# Patient Record
Sex: FEMALE | Race: WHITE | NOT HISPANIC OR LATINO | Employment: UNEMPLOYED | URBAN - METROPOLITAN AREA
[De-identification: names, ages, dates, MRNs, and addresses within clinical notes are randomized per-mention and may not be internally consistent; named-entity substitution may affect disease eponyms.]

---

## 2021-08-25 ENCOUNTER — HOSPITAL ENCOUNTER (EMERGENCY)
Facility: MEDICAL CENTER | Age: 21
End: 2021-08-25

## 2021-08-25 VITALS
BODY MASS INDEX: 25.46 KG/M2 | HEIGHT: 71 IN | SYSTOLIC BLOOD PRESSURE: 110 MMHG | WEIGHT: 181.88 LBS | TEMPERATURE: 97.9 F | DIASTOLIC BLOOD PRESSURE: 86 MMHG | HEART RATE: 95 BPM | RESPIRATION RATE: 17 BRPM | OXYGEN SATURATION: 95 %

## 2021-08-25 PROCEDURE — 302449 STATCHG TRIAGE ONLY (STATISTIC)

## 2021-08-25 NOTE — ED NOTES
"Pt states she was unprepared for wait time. Pt stating \"im going to find somewhere else to go\". Pt aware of ama instructions. Pt signed AMA.   "

## 2021-08-25 NOTE — ED TRIAGE NOTES
"Chief Complaint   Patient presents with   • Vaginal Pain     \"throbbing\" pressure and \"stabbing razors throughout my canal.\"   • Nephrolithiasis     \"the size of a walnut and it was black\"     /86   Pulse 95   Temp 36.6 °C (97.9 °F) (Temporal)   Resp 17   Ht 1.803 m (5' 11\")   Wt 82.5 kg (181 lb 14.1 oz)   SpO2 95%   Pt informed of wait times. Educated on triage process.  Asked to return to triage RN for any new or worsening of symptoms. Thanked for patience.        "

## 2023-02-10 ENCOUNTER — OFFICE VISIT (OUTPATIENT)
Dept: MEDICAL GROUP | Facility: MEDICAL CENTER | Age: 23
End: 2023-02-10
Attending: FAMILY MEDICINE
Payer: MEDICAID

## 2023-02-10 ENCOUNTER — PHARMACY VISIT (OUTPATIENT)
Dept: PHARMACY | Facility: MEDICAL CENTER | Age: 23
End: 2023-02-10
Payer: COMMERCIAL

## 2023-02-10 VITALS
HEART RATE: 69 BPM | BODY MASS INDEX: 24.16 KG/M2 | TEMPERATURE: 98.1 F | SYSTOLIC BLOOD PRESSURE: 100 MMHG | OXYGEN SATURATION: 99 % | DIASTOLIC BLOOD PRESSURE: 62 MMHG | WEIGHT: 163.1 LBS | HEIGHT: 69 IN | RESPIRATION RATE: 16 BRPM

## 2023-02-10 DIAGNOSIS — Z30.8 ENCOUNTER FOR OTHER CONTRACEPTIVE MANAGEMENT: ICD-10-CM

## 2023-02-10 DIAGNOSIS — F33.2 SEVERE EPISODE OF RECURRENT MAJOR DEPRESSIVE DISORDER, WITHOUT PSYCHOTIC FEATURES (HCC): ICD-10-CM

## 2023-02-10 DIAGNOSIS — R06.02 SOB (SHORTNESS OF BREATH): ICD-10-CM

## 2023-02-10 DIAGNOSIS — G47.09 OTHER INSOMNIA: ICD-10-CM

## 2023-02-10 DIAGNOSIS — F43.10 PTSD (POST-TRAUMATIC STRESS DISORDER): ICD-10-CM

## 2023-02-10 DIAGNOSIS — Z23 NEED FOR VACCINATION: ICD-10-CM

## 2023-02-10 DIAGNOSIS — F48.9 MENTAL HEALTH PROBLEM: ICD-10-CM

## 2023-02-10 PROCEDURE — 90471 IMMUNIZATION ADMIN: CPT | Performed by: FAMILY MEDICINE

## 2023-02-10 PROCEDURE — 99213 OFFICE O/P EST LOW 20 MIN: CPT | Performed by: FAMILY MEDICINE

## 2023-02-10 PROCEDURE — RXMED WILLOW AMBULATORY MEDICATION CHARGE: Performed by: FAMILY MEDICINE

## 2023-02-10 PROCEDURE — 90677 PCV20 VACCINE IM: CPT | Performed by: FAMILY MEDICINE

## 2023-02-10 PROCEDURE — 99214 OFFICE O/P EST MOD 30 MIN: CPT | Mod: 25 | Performed by: FAMILY MEDICINE

## 2023-02-10 PROCEDURE — 90686 IIV4 VACC NO PRSV 0.5 ML IM: CPT

## 2023-02-10 RX ORDER — ESCITALOPRAM OXALATE 10 MG/1
10 TABLET ORAL DAILY
Qty: 30 TABLET | Refills: 5 | Status: SHIPPED | OUTPATIENT
Start: 2023-02-10

## 2023-02-10 RX ORDER — QUETIAPINE FUMARATE 25 MG/1
25 TABLET, FILM COATED ORAL NIGHTLY
Qty: 30 TABLET | Refills: 5 | Status: SHIPPED | OUTPATIENT
Start: 2023-02-10 | End: 2023-02-15 | Stop reason: SDUPTHER

## 2023-02-10 ASSESSMENT — PATIENT HEALTH QUESTIONNAIRE - PHQ9
SUM OF ALL RESPONSES TO PHQ QUESTIONS 1-9: 23
5. POOR APPETITE OR OVEREATING: 2 - MORE THAN HALF THE DAYS
CLINICAL INTERPRETATION OF PHQ2 SCORE: 6

## 2023-02-10 NOTE — ASSESSMENT & PLAN NOTE
Was previously given trazodone 150mg. She reports that it causes nightmares related to PTSD so she tries not to use it.

## 2023-02-10 NOTE — ASSESSMENT & PLAN NOTE
Pt had nexplanon placed, she thinks 08/2017 or 2018. Was supposed to be taken out 2021.   She is interested in nonhormonal IUD
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
## 2023-02-10 NOTE — ASSESSMENT & PLAN NOTE
Pt reports 1 day of thoughts of hurting herself but no plan   Ongoing depression since being a child  Right now, no si/hi. Used to have visual hallucinations but no longer. She does hear voices. Never command, just conversational  Denies history of manic episode   She has tried antidepressants before - prozac, zoloft, celexa, hydroxyzine, effexor (bad side effects), cymbalta (didn't help), abilify (made her lactate and weight gain)    Depression Screening    Little interest or pleasure in doing things?  3 - nearly every day   Feeling down, depressed , or hopeless? 3 - nearly every day   Trouble falling or staying asleep, or sleeping too much?  3 - nearly every day   Feeling tired or having little energy?  3 - nearly every day   Poor appetite or overeating?  2 - more than half the days   Feeling bad about yourself - or that you are a failure or have let yourself or your family down? 3 - nearly every day   Trouble concentrating on things, such as reading the newspaper or watching television? 3 - nearly every day   Moving or speaking so slowly that other people could have noticed.  Or the opposite - being so fidgety or restless that you have been moving around a lot more than usual?  2 - more than half the days   Thoughts that you would be better off dead, or of hurting yourself?  1 - several days   Patient Health Questionnaire Score: 23       If depressive symptoms identified deferred to follow up visit unless specifically addressed in assesment and plan.    Interpretation of PHQ-9 Total Score   Score Severity   1-4 No Depression   5-9 Mild Depression   10-14 Moderate Depression   15-19 Moderately Severe Depression   20-27 Severe Depression
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
## 2023-02-15 ENCOUNTER — OFFICE VISIT (OUTPATIENT)
Dept: MEDICAL GROUP | Facility: MEDICAL CENTER | Age: 23
End: 2023-02-15
Attending: FAMILY MEDICINE
Payer: MEDICAID

## 2023-02-15 VITALS
TEMPERATURE: 97.8 F | BODY MASS INDEX: 23.19 KG/M2 | RESPIRATION RATE: 14 BRPM | OXYGEN SATURATION: 98 % | DIASTOLIC BLOOD PRESSURE: 64 MMHG | SYSTOLIC BLOOD PRESSURE: 110 MMHG | WEIGHT: 153 LBS | HEIGHT: 68 IN | HEART RATE: 67 BPM

## 2023-02-15 DIAGNOSIS — F33.2 SEVERE EPISODE OF RECURRENT MAJOR DEPRESSIVE DISORDER, WITHOUT PSYCHOTIC FEATURES (HCC): ICD-10-CM

## 2023-02-15 DIAGNOSIS — R06.02 SOB (SHORTNESS OF BREATH): ICD-10-CM

## 2023-02-15 DIAGNOSIS — G47.09 OTHER INSOMNIA: ICD-10-CM

## 2023-02-15 PROCEDURE — 99213 OFFICE O/P EST LOW 20 MIN: CPT | Performed by: FAMILY MEDICINE

## 2023-02-15 PROCEDURE — 99214 OFFICE O/P EST MOD 30 MIN: CPT | Performed by: FAMILY MEDICINE

## 2023-02-15 RX ORDER — ALBUTEROL SULFATE 90 UG/1
2 AEROSOL, METERED RESPIRATORY (INHALATION) EVERY 6 HOURS PRN
Qty: 18 G | Refills: 5 | Status: SHIPPED | OUTPATIENT
Start: 2023-02-15

## 2023-02-15 RX ORDER — QUETIAPINE FUMARATE 50 MG/1
50 TABLET, FILM COATED ORAL NIGHTLY
Qty: 30 TABLET | Refills: 3 | Status: SHIPPED | OUTPATIENT
Start: 2023-02-15

## 2023-02-16 PROCEDURE — RXMED WILLOW AMBULATORY MEDICATION CHARGE: Performed by: FAMILY MEDICINE

## 2023-02-16 NOTE — ASSESSMENT & PLAN NOTE
Pt was started on seroquel 25mg once a day for sleep  She reports it has been helping her stay asleep but not helping her fall asleep.

## 2023-02-16 NOTE — ASSESSMENT & PLAN NOTE
Pt reports she started on lexpapro, and she reports that it has been very helpful   She states that she is not having any side effects with it.

## 2023-02-16 NOTE — ASSESSMENT & PLAN NOTE
Has had it since she was 16 years old.   Uses her boyfriend's mother's inhaler, and she thinks it is albuterol inhaler. It helps significantly.   Triggers include: sometimes activity induced, sometimes with chest pressure, heat/steam  She does get wheezing as well   Significantly improved after she stopped smoking

## 2023-02-16 NOTE — PROGRESS NOTES
Subjective:     CC:   Chief Complaint   Patient presents with    Sleep Problem     Would like to discuss increasing Seroquel          HPI:     Other insomnia  Pt was started on seroquel 25mg once a day for sleep  She reports it has been helping her stay asleep but not helping her fall asleep.       Severe episode of recurrent major depressive disorder, without psychotic features (HCC)  Pt reports she started on lexpapro, and she reports that it has been very helpful   She states that she is not having any side effects with it.     SOB (shortness of breath)  Has had it since she was 16 years old.   Uses her boyfriend's mother's inhaler, and she thinks it is albuterol inhaler. It helps significantly.   Triggers include: sometimes activity induced, sometimes with chest pressure, heat/steam  She does get wheezing as well   Significantly improved after she stopped smoking     No past medical history on file.    Social History     Tobacco Use    Smoking status: Former     Packs/day: 2.00     Years: 9.00     Pack years: 18.00     Types: Cigarettes     Quit date: 2022     Years since quittin.2    Smokeless tobacco: Never   Vaping Use    Vaping Use: Every day    Substances: Nicotine    Devices: Disposable   Substance Use Topics    Alcohol use: Not Currently    Drug use: Yes     Types: Marijuana       Current Outpatient Medications Ordered in Epic   Medication Sig Dispense Refill    QUEtiapine (SEROQUEL) 50 MG tablet Take 1 Tablet by mouth every evening. 30 Tablet 3    albuterol 108 (90 Base) MCG/ACT Aero Soln inhalation aerosol Inhale 2 Puffs every 6 hours as needed for Shortness of Breath. 18 g 5    escitalopram (LEXAPRO) 10 MG Tab Take 1 Tablet by mouth every day. 30 Tablet 5     No current Epic-ordered facility-administered medications on file.       Allergies:  Latex and Tomato        Objective:       Exam:  /64 (BP Location: Left arm, Patient Position: Sitting, BP Cuff Size: Adult)   Pulse 67   Temp  "36.6 °C (97.8 °F) (Temporal)   Resp 14   Ht 1.715 m (5' 7.5\")   Wt 69.4 kg (153 lb)   SpO2 98%   BMI 23.61 kg/m²  Body mass index is 23.61 kg/m².    General: Normal appearing. No distress.  Pulmonary: Clear to ausculation.  Normal effort. No rales, ronchi, or wheezing.  Cardiovascular: Regular rate and rhythm without murmur.   Psych: Normal mood and affect. Alert and oriented x3. Judgment and insight is normal.      Labs:   none    Assessment & Plan:     22 y.o. female with the following -     1. Other insomnia  - QUEtiapine (SEROQUEL) 50 MG tablet; Take 1 Tablet by mouth every evening.  Dispense: 30 Tablet; Refill: 3  Increase dose of seroquel to 50mg nightly for insomnia. She appreciates the benefit so far.     2. Severe episode of recurrent major depressive disorder, without psychotic features (HCC)  - QUEtiapine (SEROQUEL) 50 MG tablet; Take 1 Tablet by mouth every evening.  Dispense: 30 Tablet; Refill: 3  Has been doing very well with lexapro 10mg, even after 5 days. Continue current dose, reeval at next visit     3. SOB (shortness of breath)  - albuterol 108 (90 Base) MCG/ACT Aero Soln inhalation aerosol; Inhale 2 Puffs every 6 hours as needed for Shortness of Breath.  Dispense: 18 g; Refill: 5  Sounds somewhat like reactive airway, especially with significant benefit from albuterol inhaler. She is prescribed one today, advised to keep track of how often she needs it.    Return in about 3 weeks (around 3/8/2023) for as bao.    Please note that this dictation was created using voice recognition software. I have made every reasonable attempt to correct obvious errors, but I expect that there are errors of grammar and possibly content that I did not discover before finalizing the note.        "

## 2023-02-25 ENCOUNTER — APPOINTMENT (OUTPATIENT)
Dept: RADIOLOGY | Facility: MEDICAL CENTER | Age: 23
End: 2023-02-25
Attending: FAMILY MEDICINE
Payer: MEDICAID

## 2023-03-01 ENCOUNTER — PHARMACY VISIT (OUTPATIENT)
Dept: PHARMACY | Facility: MEDICAL CENTER | Age: 23
End: 2023-03-01
Payer: COMMERCIAL

## 2023-03-16 ENCOUNTER — PHARMACY VISIT (OUTPATIENT)
Dept: PHARMACY | Facility: MEDICAL CENTER | Age: 23
End: 2023-03-16
Payer: COMMERCIAL

## 2023-03-16 PROCEDURE — RXMED WILLOW AMBULATORY MEDICATION CHARGE: Performed by: FAMILY MEDICINE

## 2024-04-18 ENCOUNTER — GYNECOLOGY VISIT (OUTPATIENT)
Dept: OBGYN | Facility: CLINIC | Age: 24
End: 2024-04-18
Payer: MEDICAID

## 2024-04-18 ENCOUNTER — HOSPITAL ENCOUNTER (OUTPATIENT)
Facility: MEDICAL CENTER | Age: 24
End: 2024-04-18
Attending: NURSE PRACTITIONER
Payer: MEDICAID

## 2024-04-18 VITALS — WEIGHT: 163 LBS | SYSTOLIC BLOOD PRESSURE: 100 MMHG | DIASTOLIC BLOOD PRESSURE: 60 MMHG | BODY MASS INDEX: 25.15 KG/M2

## 2024-04-18 DIAGNOSIS — Z30.46 NEXPLANON REMOVAL: ICD-10-CM

## 2024-04-18 DIAGNOSIS — J45.20 MILD INTERMITTENT ASTHMA WITHOUT COMPLICATION: ICD-10-CM

## 2024-04-18 DIAGNOSIS — Z01.419 WELL WOMAN EXAM WITH ROUTINE GYNECOLOGICAL EXAM: Primary | ICD-10-CM

## 2024-04-18 DIAGNOSIS — F90.1 ATTENTION DEFICIT HYPERACTIVITY DISORDER (ADHD), PREDOMINANTLY HYPERACTIVE TYPE: ICD-10-CM

## 2024-04-18 DIAGNOSIS — G43.109 MIGRAINE WITH AURA AND WITHOUT STATUS MIGRAINOSUS, NOT INTRACTABLE: ICD-10-CM

## 2024-04-18 DIAGNOSIS — Z01.419 WELL WOMAN EXAM WITH ROUTINE GYNECOLOGICAL EXAM: ICD-10-CM

## 2024-04-18 DIAGNOSIS — F19.91 HISTORY OF DRUG USE: ICD-10-CM

## 2024-04-18 DIAGNOSIS — Z31.69 PRE-CONCEPTION COUNSELING: ICD-10-CM

## 2024-04-18 PROBLEM — Z30.8 ENCOUNTER FOR OTHER CONTRACEPTIVE MANAGEMENT: Status: RESOLVED | Noted: 2023-02-10 | Resolved: 2024-04-18

## 2024-04-18 PROCEDURE — 11982 REMOVE DRUG IMPLANT DEVICE: CPT | Performed by: NURSE PRACTITIONER

## 2024-04-18 PROCEDURE — 3078F DIAST BP <80 MM HG: CPT | Performed by: NURSE PRACTITIONER

## 2024-04-18 PROCEDURE — 88175 CYTOPATH C/V AUTO FLUID REDO: CPT

## 2024-04-18 PROCEDURE — 87491 CHLMYD TRACH DNA AMP PROBE: CPT

## 2024-04-18 PROCEDURE — 3074F SYST BP LT 130 MM HG: CPT | Performed by: NURSE PRACTITIONER

## 2024-04-18 PROCEDURE — 87591 N.GONORRHOEAE DNA AMP PROB: CPT

## 2024-04-18 PROCEDURE — G0101 CA SCREEN;PELVIC/BREAST EXAM: HCPCS | Performed by: NURSE PRACTITIONER

## 2024-04-18 NOTE — PROCEDURES
General Procedure    Date/Time: 4/18/2024 8:54 AM    Performed by: Bruna Whitman C.N.M.  Authorized by: Bruna Whitman C.N.M.  Preparation: Patient was prepped and draped in the usual sterile fashion.  Local anesthesia used: yes  Anesthesia: local infiltration    Anesthesia:  Local anesthesia used: yes  Local Anesthetic: lidocaine 1% with epinephrine  Anesthetic total: 5 mL    Sedation:  Patient sedated: no    Patient tolerance: patient tolerated the procedure well with no immediate complications        Procedure note: Nexplanon removal;     Informed consent for removal of Nexplanon is obtained from patient, Risks of the procedure are bleeding, infection, possible difficulty with removing the implant, possible breakage of the implant, possible scarring. Patient is told of the details of the procedure and signs the consent.  The patient is positioned with her left arm in a 90 degree fashion, the implant is located and palpated.  5cc of 1% Lidocaine is injected subdermally into the area located  Betadine solution is used to cleanse the skin  After proper local anesthesia is obtained, a small incision is made with size 11 scalpel  The implant is worked out of the incision with small curved hemostat and sterile gauze  Wound is cleaned and steri-strips are placed over the incision   A pressure dressing is applied and encouraged to stay on for 24 hours   Then remove the dressing and leave the steri-strips on.  Watch for signs and symptoms of infection, redness at the site, purulent drainage.     Tolerated well    Follow up for s/x of infection or bleeding    Bruna TORRESM, APRN

## 2024-04-18 NOTE — PROGRESS NOTES
Pt here for new pt appt  Pt states she would like nexplanon removed and wants a pap  Pharmacy verified  Good #: [phone] (home)    LMP: 4/1/24  PAP: pap today  BC: nexplanon would like it removed  STD Testing:  MAMMO:
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
## 2024-04-22 LAB
C TRACH RRNA CVX QL NAA+PROBE: NEGATIVE
COMMENT NL11729A: NORMAL
CYTOLOGIST CVX/VAG CYTO: NORMAL
CYTOLOGY CVX/VAG DOC CYTO: NORMAL
CYTOLOGY CVX/VAG DOC THIN PREP: NORMAL
N GONORRHOEA RRNA CVX QL NAA+PROBE: NEGATIVE
NOTE NL11727A: NORMAL
OTHER STN SPEC: NORMAL
STAT OF ADQ CVX/VAG CYTO-IMP: NORMAL

## 2024-08-30 ENCOUNTER — ANCILLARY PROCEDURE (OUTPATIENT)
Dept: OBGYN | Facility: CLINIC | Age: 24
End: 2024-08-30
Payer: MEDICAID

## 2024-08-30 DIAGNOSIS — N91.2 AMENORRHEA: ICD-10-CM

## 2024-08-30 PROCEDURE — 76801 OB US < 14 WKS SINGLE FETUS: CPT | Performed by: OBSTETRICS & GYNECOLOGY

## 2024-09-04 ENCOUNTER — INITIAL PRENATAL (OUTPATIENT)
Dept: OBGYN | Facility: CLINIC | Age: 24
End: 2024-09-04
Payer: MEDICAID

## 2024-09-04 ENCOUNTER — APPOINTMENT (OUTPATIENT)
Dept: OBGYN | Facility: CLINIC | Age: 24
End: 2024-09-04
Payer: MEDICAID

## 2024-09-04 ENCOUNTER — HOSPITAL ENCOUNTER (OUTPATIENT)
Facility: MEDICAL CENTER | Age: 24
End: 2024-09-04
Attending: OBSTETRICS & GYNECOLOGY | Admitting: OBSTETRICS & GYNECOLOGY
Payer: MEDICAID

## 2024-09-04 ENCOUNTER — HOSPITAL ENCOUNTER (OUTPATIENT)
Facility: MEDICAL CENTER | Age: 24
End: 2024-09-04
Payer: MEDICAID

## 2024-09-04 VITALS — SYSTOLIC BLOOD PRESSURE: 98 MMHG | BODY MASS INDEX: 24.57 KG/M2 | DIASTOLIC BLOOD PRESSURE: 62 MMHG | WEIGHT: 159.2 LBS

## 2024-09-04 DIAGNOSIS — Z34.81 ENCOUNTER FOR SUPERVISION OF OTHER NORMAL PREGNANCY, FIRST TRIMESTER: ICD-10-CM

## 2024-09-04 DIAGNOSIS — Z34.01 ENCOUNTER FOR SUPERVISION OF NORMAL FIRST PREGNANCY IN FIRST TRIMESTER: ICD-10-CM

## 2024-09-04 DIAGNOSIS — F19.91 HISTORY OF DRUG USE: ICD-10-CM

## 2024-09-04 PROCEDURE — 3078F DIAST BP <80 MM HG: CPT

## 2024-09-04 PROCEDURE — 87491 CHLMYD TRACH DNA AMP PROBE: CPT

## 2024-09-04 PROCEDURE — 87591 N.GONORRHOEAE DNA AMP PROB: CPT

## 2024-09-04 PROCEDURE — 3074F SYST BP LT 130 MM HG: CPT

## 2024-09-04 PROCEDURE — 0500F INITIAL PRENATAL CARE VISIT: CPT

## 2024-09-04 RX ORDER — PNV NO.95/FERROUS FUM/FOLIC AC 28MG-0.8MG
TABLET ORAL
COMMUNITY

## 2024-09-04 RX ORDER — PNV NO.95/FERROUS FUM/FOLIC AC 28MG-0.8MG
1 TABLET ORAL DAILY
Qty: 30 TABLET | Refills: 11 | Status: SHIPPED | OUTPATIENT
Start: 2024-09-04

## 2024-09-04 ASSESSMENT — EDINBURGH POSTNATAL DEPRESSION SCALE (EPDS)
I HAVE BEEN SO UNHAPPY THAT I HAVE BEEN CRYING: NO, NEVER
I HAVE BEEN ABLE TO LAUGH AND SEE THE FUNNY SIDE OF THINGS: AS MUCH AS I ALWAYS COULD
I HAVE BLAMED MYSELF UNNECESSARILY WHEN THINGS WENT WRONG: YES, SOME OF THE TIME
THINGS HAVE BEEN GETTING ON TOP OF ME: YES, SOMETIMES I HAVEN'T BEEN COPING AS WELL AS USUAL
I HAVE FELT SCARED OR PANICKY FOR NO GOOD REASON: NO, NOT MUCH
I HAVE FELT SAD OR MISERABLE: NOT VERY OFTEN
I HAVE LOOKED FORWARD WITH ENJOYMENT TO THINGS: RATHER LESS THAN I USED TO
I HAVE BEEN SO UNHAPPY THAT I HAVE HAD DIFFICULTY SLEEPING: NOT AT ALL
TOTAL SCORE: 10
I HAVE BEEN ANXIOUS OR WORRIED FOR NO GOOD REASON: YES, VERY OFTEN
THE THOUGHT OF HARMING MYSELF HAS OCCURRED TO ME: NEVER
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
## 2024-09-04 NOTE — PROGRESS NOTES
NOB today  LMP: mid June irregular   Last pap: 4/18/24  Phone # [phone]  Pharmacy confirmed  On PNV  Genetic screening nipt testing pt doesn't want to know gender pt is doing a gender reveal.   EPDS= 10  c/o

## 2024-09-04 NOTE — LETTER
September 4, 2024            Mellisa Nunez is pregnant with an estimated delivery date of 3/24/25 at this time.        Thank you,          Anastacia Inman C.N.M.    Electronically Signed

## 2024-09-05 LAB
C TRACH DNA GENITAL QL NAA+PROBE: NEGATIVE
N GONORRHOEA DNA GENITAL QL NAA+PROBE: NEGATIVE
SPECIMEN SOURCE: NORMAL

## 2024-09-06 ENCOUNTER — HOSPITAL ENCOUNTER (OUTPATIENT)
Dept: LAB | Facility: MEDICAL CENTER | Age: 24
End: 2024-09-06
Payer: MEDICAID

## 2024-09-06 DIAGNOSIS — Z34.01 ENCOUNTER FOR SUPERVISION OF NORMAL FIRST PREGNANCY IN FIRST TRIMESTER: ICD-10-CM

## 2024-09-06 LAB
ABO GROUP BLD: NORMAL
BLD GP AB SCN SERPL QL: NORMAL
ERYTHROCYTE [DISTWIDTH] IN BLOOD BY AUTOMATED COUNT: 45.6 FL (ref 35.9–50)
HBV SURFACE AG SER QL: ABNORMAL
HCT VFR BLD AUTO: 43.1 % (ref 37–47)
HCV AB SER QL: ABNORMAL
HGB BLD-MCNC: 14.8 G/DL (ref 12–16)
HIV 1+2 AB+HIV1 P24 AG SERPL QL IA: NORMAL
MCH RBC QN AUTO: 33.3 PG (ref 27–33)
MCHC RBC AUTO-ENTMCNC: 34.3 G/DL (ref 32.2–35.5)
MCV RBC AUTO: 96.9 FL (ref 81.4–97.8)
PLATELET # BLD AUTO: 201 K/UL (ref 164–446)
PMV BLD AUTO: 11 FL (ref 9–12.9)
RBC # BLD AUTO: 4.45 M/UL (ref 4.2–5.4)
RH BLD: NORMAL
RUBV AB SER QL: 96 IU/ML
T PALLIDUM AB SER QL IA: ABNORMAL
WBC # BLD AUTO: 7.5 K/UL (ref 4.8–10.8)

## 2024-09-06 PROCEDURE — 36415 COLL VENOUS BLD VENIPUNCTURE: CPT

## 2024-09-06 PROCEDURE — 86803 HEPATITIS C AB TEST: CPT

## 2024-09-06 PROCEDURE — 86762 RUBELLA ANTIBODY: CPT

## 2024-09-06 PROCEDURE — 86900 BLOOD TYPING SEROLOGIC ABO: CPT

## 2024-09-06 PROCEDURE — 85027 COMPLETE CBC AUTOMATED: CPT

## 2024-09-06 PROCEDURE — 87086 URINE CULTURE/COLONY COUNT: CPT

## 2024-09-06 PROCEDURE — 86780 TREPONEMA PALLIDUM: CPT

## 2024-09-06 PROCEDURE — 86850 RBC ANTIBODY SCREEN: CPT

## 2024-09-06 PROCEDURE — 80307 DRUG TEST PRSMV CHEM ANLYZR: CPT

## 2024-09-06 PROCEDURE — 82105 ALPHA-FETOPROTEIN SERUM: CPT

## 2024-09-06 PROCEDURE — 87077 CULTURE AEROBIC IDENTIFY: CPT

## 2024-09-06 PROCEDURE — G0480 DRUG TEST DEF 1-7 CLASSES: HCPCS

## 2024-09-06 PROCEDURE — 87389 HIV-1 AG W/HIV-1&-2 AB AG IA: CPT

## 2024-09-06 PROCEDURE — 86901 BLOOD TYPING SEROLOGIC RH(D): CPT

## 2024-09-06 PROCEDURE — 87340 HEPATITIS B SURFACE AG IA: CPT

## 2024-09-08 LAB
AMPHET CTO UR CFM-MCNC: NEGATIVE NG/ML
BACTERIA UR CULT: ABNORMAL
BACTERIA UR CULT: ABNORMAL
BARBITURATES CTO UR CFM-MCNC: NEGATIVE NG/ML
BENZODIAZ CTO UR CFM-MCNC: NEGATIVE NG/ML
CANNABINOIDS CTO UR CFM-MCNC: NORMAL NG/ML
COCAINE CTO UR CFM-MCNC: NEGATIVE NG/ML
CREAT UR-MCNC: 161.6 MG/DL (ref 20–400)
DRUG COMMENT 753798: NORMAL
METHADONE CTO UR CFM-MCNC: NEGATIVE NG/ML
OPIATES CTO UR CFM-MCNC: NEGATIVE NG/ML
PCP CTO UR CFM-MCNC: NEGATIVE NG/ML
PROPOXYPH CTO UR CFM-MCNC: NEGATIVE NG/ML
SIGNIFICANT IND 70042: ABNORMAL
SITE SITE: ABNORMAL
SOURCE SOURCE: ABNORMAL

## 2024-09-10 ENCOUNTER — TELEPHONE (OUTPATIENT)
Dept: OBGYN | Facility: CLINIC | Age: 24
End: 2024-09-10
Payer: MEDICAID

## 2024-09-10 LAB
# FETUSES US: NORMAL
AFP MOM SERPL: NORMAL
AFP SERPL-MCNC: 7 NG/ML
AGE - REPORTED: 25 YR
CURRENT SMOKER: NO
FAMILY MEMBER DISEASES HX: NO
GA METHOD: NORMAL
GA: NORMAL WK
IDDM PATIENT QL: NO
INTEGRATED SCN PATIENT-IMP: NORMAL
SPECIMEN DRAWN SERPL: NORMAL

## 2024-09-10 RX ORDER — METRONIDAZOLE 500 MG/1
500 TABLET ORAL 2 TIMES DAILY
Qty: 14 TABLET | Refills: 0 | Status: SHIPPED | OUTPATIENT
Start: 2024-09-10 | End: 2024-09-11 | Stop reason: SDUPTHER

## 2024-09-10 NOTE — TELEPHONE ENCOUNTER
----- Message from Midwife Anastacia Inman C.N.M. sent at 9/10/2024  9:18 AM PDT -----  Please call pt to disc UTI, Rx sent to pharmacy.    9/9/2024 1004  Pt notified of UTI and needs to start on antibiotics, instructions given. Advised pt to increase water intake to minimum of 4 lt of water a day. Rx sent by provider. Pharmacy verified with pt.   Informed pt to take the med with food, but not milk and to avoid alcohol while on the med. Pt agreed and verbalized understanding.

## 2024-09-11 RX ORDER — METRONIDAZOLE 500 MG/1
500 TABLET ORAL 2 TIMES DAILY
Qty: 14 TABLET | Refills: 0 | Status: SHIPPED | OUTPATIENT
Start: 2024-09-11 | End: 2024-09-18

## 2024-09-12 ENCOUNTER — ANCILLARY PROCEDURE (OUTPATIENT)
Dept: OBGYN | Facility: CLINIC | Age: 24
End: 2024-09-12
Payer: MEDICAID

## 2024-09-12 VITALS
SYSTOLIC BLOOD PRESSURE: 100 MMHG | HEART RATE: 81 BPM | DIASTOLIC BLOOD PRESSURE: 66 MMHG | WEIGHT: 157.6 LBS | BODY MASS INDEX: 24.32 KG/M2

## 2024-09-12 DIAGNOSIS — Z34.01 ENCOUNTER FOR SUPERVISION OF NORMAL FIRST PREGNANCY IN FIRST TRIMESTER: ICD-10-CM

## 2024-09-12 LAB — THC UR CFM-MCNC: >500 NG/ML

## 2024-09-12 PROCEDURE — 99214 OFFICE O/P EST MOD 30 MIN: CPT | Performed by: OBSTETRICS & GYNECOLOGY

## 2024-09-12 PROCEDURE — 76801 OB US < 14 WKS SINGLE FETUS: CPT | Performed by: OBSTETRICS & GYNECOLOGY

## 2024-09-13 NOTE — TELEPHONE ENCOUNTER
Received request via: Patient    Was the patient seen in the last year in this department? Yes    Does the patient have an active prescription (recently filled or refills available) for medication(s) requested? Yes. Just need to update pharmacy to [facility]!    Pharmacy Name: [facility] on [address]    Does the patient have MCFP Plus and need 100-day supply? (This applies to ALL medications) Patient does not have SCP

## 2024-09-16 LAB
Lab: NORMAL
NTRA 1P36 DELETION SYNDROME POPULATION-BASED RISK TEXT: NORMAL
NTRA 1P36 DELETION SYNDROME RESULT TEXT: NORMAL
NTRA 1P36 DELETION SYNDROME RISK SCORE TEXT: NORMAL
NTRA 22Q11.2 DELETION SYNDROME POPULATION-BASED RISK TEXT: NORMAL
NTRA 22Q11.2 DELETION SYNDROME RESULT TEXT: NORMAL
NTRA 22Q11.2 DELETION SYNDROME RISK SCORE TEXT: NORMAL
NTRA ANGELMAN SYNDROME POPULATION-BASED RISK TEXT: NORMAL
NTRA ANGELMAN SYNDROME RESULT TEXT: NORMAL
NTRA ANGELMAN SYNDROME RISK SCORE TEXT: NORMAL
NTRA CRI-DU-CHAT SYNDROME POPULATION-BASED RISK TEXT: NORMAL
NTRA CRI-DU-CHAT SYNDROME RESULT TEXT: NORMAL
NTRA CRI-DU-CHAT SYNDROME RISK SCORE TEXT: NORMAL
NTRA FETAL FRACTION: NORMAL
NTRA GENDER OF FETUS: NORMAL
NTRA MONOSOMY X AGE-BASED RISK TEXT: NORMAL
NTRA MONOSOMY X RESULT TEXT: NORMAL
NTRA MONOSOMY X RISK SCORE TEXT: NORMAL
NTRA PRADER-WILLI SYNDROME POPULATION-BASED RISK TEXT: NORMAL
NTRA PRADER-WILLI SYNDROME RESULT TEXT: NORMAL
NTRA PRADER-WILLI SYNDROME RISK SCORE TEXT: NORMAL
NTRA TRIPLOIDY RESULT TEXT: NORMAL
NTRA TRISOMY 13 AGE-BASED RISK TEXT: NORMAL
NTRA TRISOMY 13 RESULT TEXT: NORMAL
NTRA TRISOMY 13 RISK SCORE TEXT: NORMAL
NTRA TRISOMY 18 AGE-BASED RISK TEXT: NORMAL
NTRA TRISOMY 18 RESULT TEXT: NORMAL
NTRA TRISOMY 18 RISK SCORE TEXT: NORMAL
NTRA TRISOMY 21 AGE-BASED RISK TEXT: NORMAL
NTRA TRISOMY 21 RESULT TEXT: NORMAL
NTRA TRISOMY 21 RISK SCORE TEXT: NORMAL

## 2024-09-17 ENCOUNTER — TELEPHONE (OUTPATIENT)
Dept: OBGYN | Facility: CLINIC | Age: 24
End: 2024-09-17
Payer: MEDICAID

## 2024-09-17 DIAGNOSIS — Z34.82 ENCOUNTER FOR SUPERVISION OF OTHER NORMAL PREGNANCY, SECOND TRIMESTER: ICD-10-CM

## 2024-09-17 NOTE — TELEPHONE ENCOUNTER
"----- Message from Midwife Anastacia Inman C.N.M. sent at 9/17/2024 10:19 AM PDT -----  Please call pt to notify her that she will need to repeat NIPT for genetic testing. Her last test resulted \"no results due to limitations of the test algorithm\" and recommended a repeat sample. Order has been placed though pt will need to come  a box.     9/17/2024 1127  Called pt and notified as above. Pt verbalized understanding. Pt would like to have it drawn again and will come  lab slip and box tomorrow. Informed pt that we will have it at the  for her to . Pt verbalized understanding.      "

## 2024-09-19 ENCOUNTER — HOSPITAL ENCOUNTER (OUTPATIENT)
Dept: LAB | Facility: MEDICAL CENTER | Age: 24
End: 2024-09-19
Payer: MEDICAID

## 2024-09-19 PROCEDURE — 36415 COLL VENOUS BLD VENIPUNCTURE: CPT

## 2024-09-19 RX ORDER — METRONIDAZOLE 500 MG/1
500 TABLET ORAL 2 TIMES DAILY
Qty: 14 TABLET | Refills: 0 | Status: SHIPPED | OUTPATIENT
Start: 2024-09-19 | End: 2024-09-26

## 2024-09-19 RX ORDER — PNV NO.95/FERROUS FUM/FOLIC AC 28MG-0.8MG
1 TABLET ORAL DAILY
Qty: 30 TABLET | Refills: 11 | OUTPATIENT
Start: 2024-09-19

## 2024-09-19 RX ORDER — ONDANSETRON 4 MG/1
4 TABLET, FILM COATED ORAL EVERY 4 HOURS PRN
Qty: 30 EACH | Refills: 0 | Status: SHIPPED | OUTPATIENT
Start: 2024-09-19

## 2024-09-19 RX ORDER — METRONIDAZOLE 500 MG/1
500 TABLET ORAL 2 TIMES DAILY
Qty: 14 TABLET | Refills: 0 | OUTPATIENT
Start: 2024-09-19 | End: 2024-09-26

## 2024-10-02 ENCOUNTER — ROUTINE PRENATAL (OUTPATIENT)
Dept: OBGYN | Facility: CLINIC | Age: 24
End: 2024-10-02
Payer: MEDICAID

## 2024-10-02 VITALS — DIASTOLIC BLOOD PRESSURE: 62 MMHG | BODY MASS INDEX: 25.18 KG/M2 | SYSTOLIC BLOOD PRESSURE: 112 MMHG | WEIGHT: 163.2 LBS

## 2024-10-02 DIAGNOSIS — Z34.01 ENCOUNTER FOR SUPERVISION OF NORMAL FIRST PREGNANCY IN FIRST TRIMESTER: ICD-10-CM

## 2024-10-02 DIAGNOSIS — Z34.02 ENCOUNTER FOR SUPERVISION OF NORMAL FIRST PREGNANCY IN SECOND TRIMESTER: ICD-10-CM

## 2024-10-02 PROCEDURE — 0502F SUBSEQUENT PRENATAL CARE: CPT

## 2024-10-02 PROCEDURE — 3078F DIAST BP <80 MM HG: CPT

## 2024-10-02 PROCEDURE — 3074F SYST BP LT 130 MM HG: CPT

## 2024-10-02 RX ORDER — PNV NO.95/FERROUS FUM/FOLIC AC 28MG-0.8MG
1 TABLET ORAL DAILY
Qty: 30 TABLET | Refills: 6 | Status: SHIPPED | OUTPATIENT
Start: 2024-10-02

## 2024-10-09 RX ORDER — PNV NO.95/FERROUS FUM/FOLIC AC 28MG-0.8MG
1 TABLET ORAL DAILY
Qty: 30 TABLET | Refills: 11 | Status: SHIPPED | OUTPATIENT
Start: 2024-10-09

## 2024-10-16 ENCOUNTER — HOSPITAL ENCOUNTER (EMERGENCY)
Facility: MEDICAL CENTER | Age: 24
End: 2024-10-16
Attending: EMERGENCY MEDICINE
Payer: MEDICAID

## 2024-10-16 VITALS
SYSTOLIC BLOOD PRESSURE: 103 MMHG | DIASTOLIC BLOOD PRESSURE: 57 MMHG | WEIGHT: 163 LBS | HEIGHT: 68 IN | HEART RATE: 63 BPM | RESPIRATION RATE: 14 BRPM | TEMPERATURE: 99 F | BODY MASS INDEX: 24.71 KG/M2 | OXYGEN SATURATION: 98 %

## 2024-10-16 DIAGNOSIS — Z3A.17 17 WEEKS GESTATION OF PREGNANCY: ICD-10-CM

## 2024-10-16 DIAGNOSIS — K52.9 GASTROENTERITIS: ICD-10-CM

## 2024-10-16 LAB
ALBUMIN SERPL BCP-MCNC: 3.8 G/DL (ref 3.2–4.9)
ALBUMIN/GLOB SERPL: 1.3 G/DL
ALP SERPL-CCNC: 60 U/L (ref 30–99)
ALT SERPL-CCNC: 12 U/L (ref 2–50)
ANION GAP SERPL CALC-SCNC: 16 MMOL/L (ref 7–16)
APPEARANCE UR: CLEAR
AST SERPL-CCNC: 18 U/L (ref 12–45)
B-HCG SERPL-ACNC: ABNORMAL MIU/ML (ref 0–5)
BASOPHILS # BLD AUTO: 0.2 % (ref 0–1.8)
BASOPHILS # BLD: 0.03 K/UL (ref 0–0.12)
BILIRUB SERPL-MCNC: 0.5 MG/DL (ref 0.1–1.5)
BILIRUB UR QL STRIP.AUTO: NEGATIVE
BUN SERPL-MCNC: 9 MG/DL (ref 8–22)
CALCIUM ALBUM COR SERPL-MCNC: 9.5 MG/DL (ref 8.5–10.5)
CALCIUM SERPL-MCNC: 9.3 MG/DL (ref 8.5–10.5)
CHLORIDE SERPL-SCNC: 102 MMOL/L (ref 96–112)
CO2 SERPL-SCNC: 17 MMOL/L (ref 20–33)
COLOR UR: YELLOW
CREAT SERPL-MCNC: 0.53 MG/DL (ref 0.5–1.4)
EKG IMPRESSION: NORMAL
EOSINOPHIL # BLD AUTO: 0.03 K/UL (ref 0–0.51)
EOSINOPHIL NFR BLD: 0.2 % (ref 0–6.9)
ERYTHROCYTE [DISTWIDTH] IN BLOOD BY AUTOMATED COUNT: 47.9 FL (ref 35.9–50)
GFR SERPLBLD CREATININE-BSD FMLA CKD-EPI: 132 ML/MIN/1.73 M 2
GLOBULIN SER CALC-MCNC: 3 G/DL (ref 1.9–3.5)
GLUCOSE SERPL-MCNC: 89 MG/DL (ref 65–99)
GLUCOSE UR STRIP.AUTO-MCNC: NEGATIVE MG/DL
HCT VFR BLD AUTO: 41.8 % (ref 37–47)
HGB BLD-MCNC: 14.8 G/DL (ref 12–16)
IMM GRANULOCYTES # BLD AUTO: 0.06 K/UL (ref 0–0.11)
IMM GRANULOCYTES NFR BLD AUTO: 0.4 % (ref 0–0.9)
KETONES UR STRIP.AUTO-MCNC: >=80 MG/DL
LEUKOCYTE ESTERASE UR QL STRIP.AUTO: NEGATIVE
LIPASE SERPL-CCNC: 23 U/L (ref 11–82)
LYMPHOCYTES # BLD AUTO: 0.59 K/UL (ref 1–4.8)
LYMPHOCYTES NFR BLD: 4.1 % (ref 22–41)
MCH RBC QN AUTO: 34.2 PG (ref 27–33)
MCHC RBC AUTO-ENTMCNC: 35.4 G/DL (ref 32.2–35.5)
MCV RBC AUTO: 96.5 FL (ref 81.4–97.8)
MICRO URNS: ABNORMAL
MONOCYTES # BLD AUTO: 0.48 K/UL (ref 0–0.85)
MONOCYTES NFR BLD AUTO: 3.3 % (ref 0–13.4)
NEUTROPHILS # BLD AUTO: 13.34 K/UL (ref 1.82–7.42)
NEUTROPHILS NFR BLD: 91.8 % (ref 44–72)
NITRITE UR QL STRIP.AUTO: NEGATIVE
NRBC # BLD AUTO: 0 K/UL
NRBC BLD-RTO: 0 /100 WBC (ref 0–0.2)
NUMBER OF RH DOSES IND 8505RD: 1
PH UR STRIP.AUTO: 6 [PH] (ref 5–8)
PLATELET # BLD AUTO: 182 K/UL (ref 164–446)
PMV BLD AUTO: 10.5 FL (ref 9–12.9)
POTASSIUM SERPL-SCNC: 4.1 MMOL/L (ref 3.6–5.5)
PROT SERPL-MCNC: 6.8 G/DL (ref 6–8.2)
PROT UR QL STRIP: NEGATIVE MG/DL
RBC # BLD AUTO: 4.33 M/UL (ref 4.2–5.4)
RBC UR QL AUTO: NEGATIVE
RH BLD: NORMAL
SODIUM SERPL-SCNC: 135 MMOL/L (ref 135–145)
SP GR UR STRIP.AUTO: >=1.03
UROBILINOGEN UR STRIP.AUTO-MCNC: 0.2 MG/DL
WBC # BLD AUTO: 14.5 K/UL (ref 4.8–10.8)

## 2024-10-16 PROCEDURE — 81003 URINALYSIS AUTO W/O SCOPE: CPT

## 2024-10-16 PROCEDURE — 86901 BLOOD TYPING SEROLOGIC RH(D): CPT

## 2024-10-16 PROCEDURE — 85025 COMPLETE CBC W/AUTO DIFF WBC: CPT

## 2024-10-16 PROCEDURE — 84702 CHORIONIC GONADOTROPIN TEST: CPT

## 2024-10-16 PROCEDURE — 80053 COMPREHEN METABOLIC PANEL: CPT

## 2024-10-16 PROCEDURE — 93005 ELECTROCARDIOGRAM TRACING: CPT | Performed by: EMERGENCY MEDICINE

## 2024-10-16 PROCEDURE — 99284 EMERGENCY DEPT VISIT MOD MDM: CPT

## 2024-10-16 PROCEDURE — 83690 ASSAY OF LIPASE: CPT

## 2024-10-16 PROCEDURE — 93005 ELECTROCARDIOGRAM TRACING: CPT

## 2024-10-16 PROCEDURE — 36415 COLL VENOUS BLD VENIPUNCTURE: CPT

## 2024-10-16 RX ORDER — PROMETHAZINE HYDROCHLORIDE 25 MG/1
25 SUPPOSITORY RECTAL EVERY 6 HOURS PRN
Qty: 10 SUPPOSITORY | Refills: 0 | Status: SHIPPED | OUTPATIENT
Start: 2024-10-16

## 2024-10-17 ENCOUNTER — TELEPHONE (OUTPATIENT)
Dept: OBGYN | Facility: CLINIC | Age: 24
End: 2024-10-17
Payer: MEDICAID

## 2024-10-17 PROBLEM — O9A.319: Status: ACTIVE | Noted: 2024-10-17

## 2024-11-01 ENCOUNTER — APPOINTMENT (OUTPATIENT)
Dept: OBGYN | Facility: CLINIC | Age: 24
End: 2024-11-01
Payer: MEDICAID

## 2024-11-04 ENCOUNTER — ROUTINE PRENATAL (OUTPATIENT)
Dept: OBGYN | Facility: CLINIC | Age: 24
End: 2024-11-04
Payer: MEDICAID

## 2024-11-04 VITALS — WEIGHT: 168.4 LBS | DIASTOLIC BLOOD PRESSURE: 60 MMHG | BODY MASS INDEX: 25.61 KG/M2 | SYSTOLIC BLOOD PRESSURE: 92 MMHG

## 2024-11-04 DIAGNOSIS — J06.9 UPPER RESPIRATORY TRACT INFECTION, UNSPECIFIED TYPE: ICD-10-CM

## 2024-11-04 DIAGNOSIS — Z3A.20 20 WEEKS GESTATION OF PREGNANCY: ICD-10-CM

## 2024-11-04 DIAGNOSIS — Z34.02 ENCOUNTER FOR SUPERVISION OF NORMAL FIRST PREGNANCY IN SECOND TRIMESTER: ICD-10-CM

## 2024-11-04 PROCEDURE — 3078F DIAST BP <80 MM HG: CPT | Performed by: OBSTETRICS & GYNECOLOGY

## 2024-11-04 PROCEDURE — 0502F SUBSEQUENT PRENATAL CARE: CPT | Performed by: OBSTETRICS & GYNECOLOGY

## 2024-11-04 PROCEDURE — 3074F SYST BP LT 130 MM HG: CPT | Performed by: OBSTETRICS & GYNECOLOGY

## 2024-11-04 ASSESSMENT — FIBROSIS 4 INDEX: FIB4 SCORE: 0.69

## 2024-11-04 NOTE — PROGRESS NOTES
Pt. Here for OB/FU.   20w0d  Reports some FM.   Pt. Denies VB, LOF, or UC's.   Flu declined today    Pt states she has a cough for about 3 weeks that has been affecting her voice as well. She would also like to consult traveling precaution.     Phone/Pharm verified.  [phone]

## 2024-11-18 ENCOUNTER — ROUTINE PRENATAL (OUTPATIENT)
Dept: OBGYN | Facility: CLINIC | Age: 24
End: 2024-11-18
Payer: MEDICAID

## 2024-11-18 VITALS — WEIGHT: 173 LBS | SYSTOLIC BLOOD PRESSURE: 99 MMHG | BODY MASS INDEX: 26.3 KG/M2 | DIASTOLIC BLOOD PRESSURE: 64 MMHG

## 2024-11-18 DIAGNOSIS — Z34.01 ENCOUNTER FOR SUPERVISION OF NORMAL FIRST PREGNANCY IN FIRST TRIMESTER: ICD-10-CM

## 2024-11-18 PROCEDURE — 3078F DIAST BP <80 MM HG: CPT | Performed by: MIDWIFE

## 2024-11-18 PROCEDURE — 0502F SUBSEQUENT PRENATAL CARE: CPT | Performed by: MIDWIFE

## 2024-11-18 PROCEDURE — 3074F SYST BP LT 130 MM HG: CPT | Performed by: MIDWIFE

## 2024-11-18 ASSESSMENT — FIBROSIS 4 INDEX: FIB4 SCORE: 0.69

## 2024-11-18 NOTE — ASSESSMENT & PLAN NOTE
Pt is a 24 y.o.  at 22w0d gestation here today for routine prenatal care.   Size equal to dates    Discuss 2nd trimester warning signs  Address concerns: Discuss use of unisom for sleep disturbances  Anatomy scan needs to schedule. Reprint order.   RTC 4 wks

## 2024-11-18 NOTE — PROGRESS NOTES
S: Pt is a 24 y.o.  at 22w0d gestation here today for routine prenatal care.     Concerns today include:  Insomnia    Denies: vaginal bleeding, pelvic and abdominal pain, cramping, contractions, leaking of fluid, urinary and vaginal symptoms    Pt reports fetal movement as Present     O: BP 99/64   Wt 173 lb   LMP 2024 (Approximate)   BMI 26.30 kg/m²    *see prenatal flowsheet*     Labs:     Prenatal labs: Completed and normal  GCT: none   GBS: Not yet collected  Genetic testing: NIPT low risk  STI testing: negative    Ultrasound: none since last visit    A/P:     Problem List Items Addressed This Visit          Unprioritized    Encounter for supervision of normal first pregnancy in first trimester     Pt is a 24 y.o.  at 22w0d gestation here today for routine prenatal care.   Size equal to dates    Discuss 2nd trimester warning signs  Address concerns: Discuss use of unisom for sleep disturbances  Anatomy scan needs to schedule. Reprint order.   RTC 4 wks

## 2024-11-18 NOTE — PROGRESS NOTES
Pt here today for OB follow up  Pt states she needs an appointment for US  Has not repeated AFP  Reports +FM  Pharmacy Confirmed.  Chaperone offered and declined.

## 2024-12-10 ENCOUNTER — ROUTINE PRENATAL (OUTPATIENT)
Dept: OBGYN | Facility: CLINIC | Age: 24
End: 2024-12-10
Payer: MEDICAID

## 2024-12-10 VITALS — DIASTOLIC BLOOD PRESSURE: 52 MMHG | WEIGHT: 183.6 LBS | SYSTOLIC BLOOD PRESSURE: 94 MMHG | BODY MASS INDEX: 27.92 KG/M2

## 2024-12-10 DIAGNOSIS — Z3A.25 25 WEEKS GESTATION OF PREGNANCY: ICD-10-CM

## 2024-12-10 DIAGNOSIS — Z34.02 ENCOUNTER FOR SUPERVISION OF NORMAL FIRST PREGNANCY IN SECOND TRIMESTER: ICD-10-CM

## 2024-12-10 PROCEDURE — 3078F DIAST BP <80 MM HG: CPT | Performed by: OBSTETRICS & GYNECOLOGY

## 2024-12-10 PROCEDURE — 0502F SUBSEQUENT PRENATAL CARE: CPT | Performed by: OBSTETRICS & GYNECOLOGY

## 2024-12-10 PROCEDURE — 3074F SYST BP LT 130 MM HG: CPT | Performed by: OBSTETRICS & GYNECOLOGY

## 2024-12-10 ASSESSMENT — FIBROSIS 4 INDEX: FIB4 SCORE: 0.69

## 2024-12-10 NOTE — PROGRESS NOTES
Pt. Here for OB/FU.   25w1d  Reports Good FM.   Pt. Denies VB, LOF, or UC's.     Pt states she has been having issues with scheduling an US for her anatomy scan. I gave her the phone number to call to make an appt [phone].    Phone/Pharm verified.    [phone]

## 2024-12-11 ENCOUNTER — HOSPITAL ENCOUNTER (OUTPATIENT)
Dept: LAB | Facility: MEDICAL CENTER | Age: 24
End: 2024-12-11
Attending: OBSTETRICS & GYNECOLOGY
Payer: MEDICAID

## 2024-12-11 DIAGNOSIS — Z3A.25 25 WEEKS GESTATION OF PREGNANCY: ICD-10-CM

## 2024-12-11 DIAGNOSIS — Z34.02 ENCOUNTER FOR SUPERVISION OF NORMAL FIRST PREGNANCY IN SECOND TRIMESTER: ICD-10-CM

## 2024-12-11 LAB
ERYTHROCYTE [DISTWIDTH] IN BLOOD BY AUTOMATED COUNT: 46.6 FL (ref 35.9–50)
HCT VFR BLD AUTO: 36 % (ref 37–47)
HGB BLD-MCNC: 12.4 G/DL (ref 12–16)
MCH RBC QN AUTO: 34.6 PG (ref 27–33)
MCHC RBC AUTO-ENTMCNC: 34.4 G/DL (ref 32.2–35.5)
MCV RBC AUTO: 100.6 FL (ref 81.4–97.8)
PLATELET # BLD AUTO: 176 K/UL (ref 164–446)
PMV BLD AUTO: 11.5 FL (ref 9–12.9)
RBC # BLD AUTO: 3.58 M/UL (ref 4.2–5.4)
T PALLIDUM AB SER QL IA: NORMAL
WBC # BLD AUTO: 10 K/UL (ref 4.8–10.8)

## 2024-12-11 PROCEDURE — 36415 COLL VENOUS BLD VENIPUNCTURE: CPT

## 2024-12-11 PROCEDURE — 85027 COMPLETE CBC AUTOMATED: CPT

## 2024-12-11 PROCEDURE — 86780 TREPONEMA PALLIDUM: CPT

## 2024-12-11 NOTE — PROGRESS NOTES
Called pt to let her know we ordered her 3rd tri labs. Gave her instructions on glucose lab. Pt verbalized understanding.

## 2025-01-14 ENCOUNTER — ROUTINE PRENATAL (OUTPATIENT)
Dept: OBGYN | Facility: CLINIC | Age: 25
End: 2025-01-14
Payer: MEDICAID

## 2025-01-14 ENCOUNTER — HOSPITAL ENCOUNTER (EMERGENCY)
Facility: MEDICAL CENTER | Age: 25
End: 2025-01-15
Attending: EMERGENCY MEDICINE
Payer: OTHER MISCELLANEOUS

## 2025-01-14 ENCOUNTER — APPOINTMENT (OUTPATIENT)
Dept: RADIOLOGY | Facility: MEDICAL CENTER | Age: 25
End: 2025-01-14
Attending: EMERGENCY MEDICINE
Payer: OTHER MISCELLANEOUS

## 2025-01-14 VITALS
BODY MASS INDEX: 28.34 KG/M2 | WEIGHT: 187 LBS | TEMPERATURE: 97.5 F | DIASTOLIC BLOOD PRESSURE: 52 MMHG | OXYGEN SATURATION: 95 % | HEIGHT: 68 IN | SYSTOLIC BLOOD PRESSURE: 91 MMHG | RESPIRATION RATE: 12 BRPM | HEART RATE: 73 BPM

## 2025-01-14 DIAGNOSIS — Z34.03 ENCOUNTER FOR SUPERVISION OF NORMAL FIRST PREGNANCY IN THIRD TRIMESTER: ICD-10-CM

## 2025-01-14 DIAGNOSIS — M54.6 ACUTE MIDLINE THORACIC BACK PAIN: ICD-10-CM

## 2025-01-14 DIAGNOSIS — M25.552 ACUTE HIP PAIN, LEFT: ICD-10-CM
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
## 2025-01-14 DIAGNOSIS — M25.572 ACUTE LEFT ANKLE PAIN: ICD-10-CM

## 2025-01-14 DIAGNOSIS — M54.2 ACUTE NECK PAIN: ICD-10-CM

## 2025-01-14 DIAGNOSIS — S90.812A: ICD-10-CM

## 2025-01-14 DIAGNOSIS — Z3A.30 30 WEEKS GESTATION OF PREGNANCY: ICD-10-CM

## 2025-01-14 DIAGNOSIS — M79.672 LEFT FOOT PAIN: ICD-10-CM

## 2025-01-14 LAB
ABO GROUP BLD: NORMAL
ALBUMIN SERPL BCP-MCNC: 3.8 G/DL (ref 3.2–4.9)
ALBUMIN/GLOB SERPL: 1.3 G/DL
ALP SERPL-CCNC: 105 U/L (ref 30–99)
ALT SERPL-CCNC: 13 U/L (ref 2–50)
ANION GAP SERPL CALC-SCNC: 13 MMOL/L (ref 7–16)
APTT PPP: 23.3 SEC (ref 24.7–36)
AST SERPL-CCNC: 13 U/L (ref 12–45)
BILIRUB SERPL-MCNC: 0.2 MG/DL (ref 0.1–1.5)
BLD GP AB SCN SERPL QL: NORMAL
BUN SERPL-MCNC: 7 MG/DL (ref 8–22)
CALCIUM ALBUM COR SERPL-MCNC: 9.1 MG/DL (ref 8.5–10.5)
CALCIUM SERPL-MCNC: 8.9 MG/DL (ref 8.5–10.5)
CHLORIDE SERPL-SCNC: 104 MMOL/L (ref 96–112)
CO2 SERPL-SCNC: 20 MMOL/L (ref 20–33)
CREAT SERPL-MCNC: 0.58 MG/DL (ref 0.5–1.4)
ERYTHROCYTE [DISTWIDTH] IN BLOOD BY AUTOMATED COUNT: 44.1 FL (ref 35.9–50)
ETHANOL BLD-MCNC: <10.1 MG/DL
GFR SERPLBLD CREATININE-BSD FMLA CKD-EPI: 129 ML/MIN/1.73 M 2
GLOBULIN SER CALC-MCNC: 3 G/DL (ref 1.9–3.5)
GLUCOSE SERPL-MCNC: 85 MG/DL (ref 65–99)
HCG SERPL QL: POSITIVE
HCT VFR BLD AUTO: 36.8 % (ref 37–47)
HGB BLD-MCNC: 12.9 G/DL (ref 12–16)
INR PPP: 0.93 (ref 0.87–1.13)
MCH RBC QN AUTO: 34 PG (ref 27–33)
MCHC RBC AUTO-ENTMCNC: 35.1 G/DL (ref 32.2–35.5)
MCV RBC AUTO: 97.1 FL (ref 81.4–97.8)
PLATELET # BLD AUTO: 175 K/UL (ref 164–446)
PMV BLD AUTO: 10.4 FL (ref 9–12.9)
POTASSIUM SERPL-SCNC: 3.8 MMOL/L (ref 3.6–5.5)
PROT SERPL-MCNC: 6.8 G/DL (ref 6–8.2)
PROTHROMBIN TIME: 12.4 SEC (ref 12–14.6)
RBC # BLD AUTO: 3.79 M/UL (ref 4.2–5.4)
RH BLD: NORMAL
SODIUM SERPL-SCNC: 137 MMOL/L (ref 135–145)
WBC # BLD AUTO: 12.8 K/UL (ref 4.8–10.8)

## 2025-01-14 PROCEDURE — 85730 THROMBOPLASTIN TIME PARTIAL: CPT

## 2025-01-14 PROCEDURE — 307740 HCHG GREEN TRAUMA TEAM SERVICES

## 2025-01-14 PROCEDURE — 0502F SUBSEQUENT PRENATAL CARE: CPT | Performed by: NURSE PRACTITIONER

## 2025-01-14 PROCEDURE — 700111 HCHG RX REV CODE 636 W/ 250 OVERRIDE (IP): Mod: UD | Performed by: EMERGENCY MEDICINE

## 2025-01-14 PROCEDURE — 86900 BLOOD TYPING SEROLOGIC ABO: CPT

## 2025-01-14 PROCEDURE — 96375 TX/PRO/DX INJ NEW DRUG ADDON: CPT

## 2025-01-14 PROCEDURE — 72040 X-RAY EXAM NECK SPINE 2-3 VW: CPT

## 2025-01-14 PROCEDURE — 72070 X-RAY EXAM THORAC SPINE 2VWS: CPT

## 2025-01-14 PROCEDURE — 85027 COMPLETE CBC AUTOMATED: CPT

## 2025-01-14 PROCEDURE — 80053 COMPREHEN METABOLIC PANEL: CPT

## 2025-01-14 PROCEDURE — 85610 PROTHROMBIN TIME: CPT

## 2025-01-14 PROCEDURE — 82077 ASSAY SPEC XCP UR&BREATH IA: CPT

## 2025-01-14 PROCEDURE — 700111 HCHG RX REV CODE 636 W/ 250 OVERRIDE (IP): Mod: JZ,UD | Performed by: OBSTETRICS & GYNECOLOGY

## 2025-01-14 PROCEDURE — 90715 TDAP VACCINE 7 YRS/> IM: CPT | Mod: JZ | Performed by: NURSE PRACTITIONER

## 2025-01-14 PROCEDURE — 36415 COLL VENOUS BLD VENIPUNCTURE: CPT

## 2025-01-14 PROCEDURE — 86850 RBC ANTIBODY SCREEN: CPT

## 2025-01-14 PROCEDURE — 90471 IMMUNIZATION ADMIN: CPT | Performed by: NURSE PRACTITIONER

## 2025-01-14 PROCEDURE — 86901 BLOOD TYPING SEROLOGIC RH(D): CPT

## 2025-01-14 PROCEDURE — 73600 X-RAY EXAM OF ANKLE: CPT | Mod: LT

## 2025-01-14 PROCEDURE — 72170 X-RAY EXAM OF PELVIS: CPT

## 2025-01-14 PROCEDURE — 96374 THER/PROPH/DIAG INJ IV PUSH: CPT

## 2025-01-14 PROCEDURE — 73620 X-RAY EXAM OF FOOT: CPT | Mod: LT

## 2025-01-14 PROCEDURE — 84703 CHORIONIC GONADOTROPIN ASSAY: CPT

## 2025-01-14 PROCEDURE — 99285 EMERGENCY DEPT VISIT HI MDM: CPT

## 2025-01-14 RX ORDER — MORPHINE SULFATE 4 MG/ML
INJECTION INTRAVENOUS
Status: COMPLETED | OUTPATIENT
Start: 2025-01-14 | End: 2025-01-14

## 2025-01-14 RX ADMIN — HUMAN RHO(D) IMMUNE GLOBULIN 300 MCG: 1500 SOLUTION INTRAMUSCULAR; INTRAVENOUS at 23:27

## 2025-01-14 RX ADMIN — MORPHINE SULFATE 4 MG: 4 INJECTION, SOLUTION INTRAMUSCULAR; INTRAVENOUS at 20:25

## 2025-01-14 ASSESSMENT — FIBROSIS 4 INDEX: FIB4 SCORE: 0.71
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
## 2025-01-14 NOTE — LETTER
"Count Your Baby's Movements  Another step to a healthy delivery    Mellisacamille Nunez  [facility]  Dept: [phone]    How Many Weeks Pregnant? 30w1d    Date to Begin Countin25              How to use this chart    One way for your physician to keep track of your baby's health is by knowing how often the baby moves (or \"kicks\") in your womb.  You can help your physician to do this by using this chart every day.    Every day, you should see how many hours it takes for your baby to move 10 times.  Start in the morning, as soon as you get up.    First, write down the time your baby moves until you get to 10.  Check off one box every time your baby moves until you get to 10.  Write down the time you finished counting in the last column.  Total how long it took to count up all 10 movements.  Finally, fill in the box that shows how long this took.  After counting 10 movements, you no longer have to count any more that day.  The next morning, just start counting again as soon as you get up.    What should you call a \"movement\"?  It is hard to say, because it will feel different from one mother to another and from one pregnancy to the next.  The important thing is that you count the movements the same way throughout your pregnancy.  If you have more questions, you should ask your physician.    Count carefully every day!  SAMPLE:  Week 28    How many hours did it take to feel 10 movements?       Start  Time     1     2     3     4     5     6     7     8     9     10   Finish Time   Mon 8:20           11:40                  Fri               Sat               Sun                 IMPORTANT: You should contact your physician if it takes more than two hours for you to feel 10 movements.  Each morning, write down the time and start to count the movements of your baby.  Keep track by checking off one box every time you feel one movement.  When you " "have felt 10 \"kicks\", write down the time you finished counting in the last column.  Then fill in the   box (over the check ale) for the number of hours it took.  Be sure to read the complete instructions on the previous page.            "

## 2025-01-14 NOTE — PROGRESS NOTES
OB follow up   + fetal movement.  No VB, LOF or UC's.  Phone # [phone]  Preferred pharmacy confirmed.  1 GTT not done yet. Planning to do it next week  Complete US not done yet. No appt scheduled  Rhogam today.  Kick count sheet given today.  TDap today

## 2025-01-15 NOTE — ED NOTES
Post op shoe placed to LLE, left foot. Crutches provided, education given. Patient verbalized understanding.
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
## 2025-01-15 NOTE — ED NOTES
Labor and delivery attending MD and RN at the bedside in trauma room. Patient turned for examination of posterior surfaces.

## 2025-01-15 NOTE — ED NOTES
Px does not want to go upstairs for fetal monitoring - L&D notified. PIV removed. DC paperwork given to patient, px verbalized understanding. Post op boot and crutches provided to patient.
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
## 2025-01-18 LAB — COMPONENT CELLULAR 8504CLL: NORMAL

## 2025-01-27 ENCOUNTER — ANCILLARY PROCEDURE (OUTPATIENT)
Dept: OBGYN | Facility: CLINIC | Age: 25
End: 2025-01-27
Attending: OBSTETRICS & GYNECOLOGY
Payer: MEDICAID

## 2025-01-27 VITALS
SYSTOLIC BLOOD PRESSURE: 108 MMHG | DIASTOLIC BLOOD PRESSURE: 62 MMHG | HEART RATE: 93 BPM | BODY MASS INDEX: 28.77 KG/M2 | WEIGHT: 189.2 LBS

## 2025-01-27 DIAGNOSIS — Z3A.25 25 WEEKS GESTATION OF PREGNANCY: ICD-10-CM

## 2025-01-27 DIAGNOSIS — Z34.02 ENCOUNTER FOR SUPERVISION OF NORMAL FIRST PREGNANCY IN SECOND TRIMESTER: ICD-10-CM

## 2025-01-27 PROCEDURE — 76805 OB US >/= 14 WKS SNGL FETUS: CPT | Performed by: OBSTETRICS & GYNECOLOGY

## 2025-01-27 ASSESSMENT — FIBROSIS 4 INDEX: FIB4 SCORE: 0.49

## 2025-01-28 ENCOUNTER — ROUTINE PRENATAL (OUTPATIENT)
Dept: OBGYN | Facility: CLINIC | Age: 25
End: 2025-01-28
Payer: MEDICAID

## 2025-01-28 VITALS — WEIGHT: 191 LBS | BODY MASS INDEX: 29.04 KG/M2 | DIASTOLIC BLOOD PRESSURE: 52 MMHG | SYSTOLIC BLOOD PRESSURE: 98 MMHG

## 2025-01-28 DIAGNOSIS — Z34.03 ENCOUNTER FOR SUPERVISION OF NORMAL FIRST PREGNANCY IN THIRD TRIMESTER: ICD-10-CM

## 2025-01-28 PROCEDURE — 0502F SUBSEQUENT PRENATAL CARE: CPT | Performed by: NURSE PRACTITIONER

## 2025-01-28 PROCEDURE — 90678 RSV VACC PREF BIVALENT IM: CPT | Performed by: NURSE PRACTITIONER

## 2025-01-28 PROCEDURE — 96381 ADMN RSV MONOC ANTB IM NJX: CPT | Performed by: NURSE PRACTITIONER

## 2025-01-28 ASSESSMENT — FIBROSIS 4 INDEX: FIB4 SCORE: 0.49

## 2025-01-28 NOTE — PROGRESS NOTES
OB follow up   + fetal movement.  No VB, LOF or UC's.  Phone # [phone]  Preferred pharmacy confirmed.  RSV today.  1 GTT not done yet.
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
## 2025-02-05 ENCOUNTER — ROUTINE PRENATAL (OUTPATIENT)
Dept: OBGYN | Facility: CLINIC | Age: 25
End: 2025-02-05
Payer: MEDICAID

## 2025-02-05 VITALS — DIASTOLIC BLOOD PRESSURE: 60 MMHG | SYSTOLIC BLOOD PRESSURE: 100 MMHG | BODY MASS INDEX: 28.68 KG/M2 | WEIGHT: 188.6 LBS

## 2025-02-05 DIAGNOSIS — Z34.03 ENCOUNTER FOR SUPERVISION OF NORMAL FIRST PREGNANCY IN THIRD TRIMESTER: Primary | ICD-10-CM

## 2025-02-05 PROCEDURE — 3078F DIAST BP <80 MM HG: CPT

## 2025-02-05 PROCEDURE — 0502F SUBSEQUENT PRENATAL CARE: CPT

## 2025-02-05 PROCEDURE — 3074F SYST BP LT 130 MM HG: CPT

## 2025-02-05 ASSESSMENT — FIBROSIS 4 INDEX: FIB4 SCORE: 0.49

## 2025-02-05 NOTE — PROGRESS NOTES
S: Pt is a 24 y.o.  at 33w2d gestation here today for routine prenatal care.     Concerns today include:   reports hip and back pain with pelvic pressure    Denies: vaginal bleeding, pelvic and abdominal pain, cramping, contractions, leaking of fluid, urinary and vaginal symptoms and headaches, visual changes, epigastric pain    Pt reports fetal movement as Present     O: /60   Wt 188 lb 9.6 oz   LMP 2024 (Approximate)   BMI 28.68 kg/m²    *see prenatal flowsheet*     Labs:     Prenatal labs: Completed and normal  GCT: not done by patient   GBS: Not yet collected  Genetic testing: NIPT low risk  STI testing: negative    Ultrasound: : dorinda 21.6 cm, ac 95%, efw 85%    A/P:     Problem List Items Addressed This Visit       Encounter for supervision of normal first pregnancy in third trimester - Primary     Pt is a 24 y.o.  at 33w2d gestation here today for routine prenatal care.   Size equal to dates    Discuss  labor and pre-eclampsia precautions.  Discuss FMA and FKCs  Address concerns: handout given on back and hip pain.  GBS Not yet collected  Rh negative. Rhogam administered .  Tdap: Administered   Reviewed 3rd tri labs: not done by patient  Desires Nexplanon for contraception postpartum.   RTC 2 wks.   Discussed results of last Ultrasound.

## 2025-02-05 NOTE — ASSESSMENT & PLAN NOTE
Pt is a 24 y.o.  at 33w2d gestation here today for routine prenatal care.   Size equal to dates    Discuss  labor and pre-eclampsia precautions.  Discuss FMA and FKCs  Address concerns: handout given on back and hip pain.  GBS Not yet collected  Rh negative. Rhogam administered .  Tdap: Administered   Reviewed 3rd tri labs: not done by patient  Desires Nexplanon for contraception postpartum.   RTC 2 wks.   Discussed results of last Ultrasound.

## 2025-02-05 NOTE — PROGRESS NOTES
Pt here today for OBFV   +FM movemnet  No VB, LOF.   Uc's  only at night for 2 hours   Phone number [phone]  Pharmacy verified   Pt states  a lot of pressure when walks or stands up, a lot of pain has to bend over because of pain its a 8-9 on pain scale